# Patient Record
Sex: MALE | Race: OTHER | NOT HISPANIC OR LATINO
[De-identification: names, ages, dates, MRNs, and addresses within clinical notes are randomized per-mention and may not be internally consistent; named-entity substitution may affect disease eponyms.]

---

## 2019-01-29 ENCOUNTER — RX RENEWAL (OUTPATIENT)
Age: 59
End: 2019-01-29

## 2019-02-22 ENCOUNTER — RX RENEWAL (OUTPATIENT)
Age: 59
End: 2019-02-22

## 2019-05-23 ENCOUNTER — RX RENEWAL (OUTPATIENT)
Age: 59
End: 2019-05-23

## 2019-11-29 ENCOUNTER — RX RENEWAL (OUTPATIENT)
Age: 59
End: 2019-11-29

## 2019-12-11 ENCOUNTER — APPOINTMENT (OUTPATIENT)
Dept: INTERNAL MEDICINE | Facility: CLINIC | Age: 59
End: 2019-12-11
Payer: COMMERCIAL

## 2019-12-11 VITALS
HEART RATE: 60 BPM | HEIGHT: 67 IN | OXYGEN SATURATION: 97 % | RESPIRATION RATE: 16 BRPM | BODY MASS INDEX: 29.22 KG/M2 | TEMPERATURE: 97.5 F | WEIGHT: 186.2 LBS

## 2019-12-11 DIAGNOSIS — Z87.19 PERSONAL HISTORY OF OTHER DISEASES OF THE DIGESTIVE SYSTEM: ICD-10-CM

## 2019-12-11 DIAGNOSIS — Z78.9 OTHER SPECIFIED HEALTH STATUS: ICD-10-CM

## 2019-12-11 PROCEDURE — 99214 OFFICE O/P EST MOD 30 MIN: CPT | Mod: 25

## 2019-12-11 PROCEDURE — 90686 IIV4 VACC NO PRSV 0.5 ML IM: CPT

## 2019-12-11 PROCEDURE — G0008: CPT

## 2019-12-11 NOTE — HISTORY OF PRESENT ILLNESS
[FreeTextEntry1] : f/u diabetis, htn [de-identified] : seeing endo and heme for cll--last hba1c 5.2--occ ED--not pervasive

## 2019-12-11 NOTE — ASSESSMENT
[FreeTextEntry1] : flu shot\par tiral viagra\par testosterone level suggested with next endo labdraw\par

## 2019-12-11 NOTE — HEALTH RISK ASSESSMENT
[Patient reported colonoscopy was normal] : Patient reported colonoscopy was normal [ColonoscopyDate] : 1/18 [ColonoscopyComments] :  bessie saucedo

## 2020-04-27 ENCOUNTER — RX RENEWAL (OUTPATIENT)
Age: 60
End: 2020-04-27

## 2020-07-01 ENCOUNTER — APPOINTMENT (OUTPATIENT)
Dept: INTERNAL MEDICINE | Facility: CLINIC | Age: 60
End: 2020-07-01
Payer: COMMERCIAL

## 2020-07-01 VITALS
BODY MASS INDEX: 29.19 KG/M2 | HEIGHT: 67 IN | TEMPERATURE: 98.3 F | RESPIRATION RATE: 16 BRPM | DIASTOLIC BLOOD PRESSURE: 72 MMHG | OXYGEN SATURATION: 97 % | SYSTOLIC BLOOD PRESSURE: 129 MMHG | WEIGHT: 186 LBS | HEART RATE: 72 BPM

## 2020-07-01 PROCEDURE — 99214 OFFICE O/P EST MOD 30 MIN: CPT

## 2020-07-01 PROCEDURE — G0444 DEPRESSION SCREEN ANNUAL: CPT

## 2020-07-01 RX ORDER — ATORVASTATIN CALCIUM 20 MG/1
20 TABLET, FILM COATED ORAL
Qty: 90 | Refills: 1 | Status: ACTIVE | COMMUNITY
Start: 1900-01-01 | End: 1900-01-01

## 2020-07-01 NOTE — HISTORY OF PRESENT ILLNESS
[FreeTextEntry1] : f/u htn [de-identified] : saw heme (vick) 3wks ago--stable cll--stage 0--no tx\par seeing endo (trena)\par am glu <120

## 2020-07-01 NOTE — ASSESSMENT
[FreeTextEntry1] : labs --to lab\par renewed meds\par coronary ct scan for risk strat\par cpx in 6 months\par renew rxn

## 2020-07-06 ENCOUNTER — TRANSCRIPTION ENCOUNTER (OUTPATIENT)
Age: 60
End: 2020-07-06

## 2020-07-23 ENCOUNTER — TRANSCRIPTION ENCOUNTER (OUTPATIENT)
Age: 60
End: 2020-07-23

## 2020-10-13 ENCOUNTER — RX RENEWAL (OUTPATIENT)
Age: 60
End: 2020-10-13

## 2021-01-25 ENCOUNTER — RX RENEWAL (OUTPATIENT)
Age: 61
End: 2021-01-25

## 2021-02-11 ENCOUNTER — APPOINTMENT (OUTPATIENT)
Dept: INTERNAL MEDICINE | Facility: CLINIC | Age: 61
End: 2021-02-11
Payer: COMMERCIAL

## 2021-02-11 VITALS — DIASTOLIC BLOOD PRESSURE: 74 MMHG | SYSTOLIC BLOOD PRESSURE: 122 MMHG

## 2021-02-11 VITALS
OXYGEN SATURATION: 96 % | BODY MASS INDEX: 30.5 KG/M2 | RESPIRATION RATE: 16 BRPM | TEMPERATURE: 98.3 F | WEIGHT: 194.31 LBS | HEART RATE: 69 BPM | HEIGHT: 67 IN

## 2021-02-11 PROCEDURE — 99214 OFFICE O/P EST MOD 30 MIN: CPT

## 2021-02-11 PROCEDURE — 99072 ADDL SUPL MATRL&STAF TM PHE: CPT

## 2021-02-11 NOTE — HISTORY OF PRESENT ILLNESS
[FreeTextEntry1] : f/u htn, diabetes [de-identified] : seeing heme (vick)\par reviewed labs from heme--plt 98k--cll hasn’t been treated\par to get labs done from endo (trena)\par last hba1c mid 5's\par no home bp's

## 2021-02-17 RX ORDER — ZOSTER VACCINE RECOMBINANT, ADJUVANTED 50 MCG/0.5
50 KIT INTRAMUSCULAR
Qty: 1 | Refills: 0 | Status: ACTIVE | COMMUNITY
Start: 2021-02-17 | End: 1900-01-01

## 2021-07-08 ENCOUNTER — RX RENEWAL (OUTPATIENT)
Age: 61
End: 2021-07-08

## 2021-10-07 ENCOUNTER — APPOINTMENT (OUTPATIENT)
Dept: INTERNAL MEDICINE | Facility: CLINIC | Age: 61
End: 2021-10-07
Payer: COMMERCIAL

## 2021-10-07 ENCOUNTER — NON-APPOINTMENT (OUTPATIENT)
Age: 61
End: 2021-10-07

## 2021-10-07 VITALS
SYSTOLIC BLOOD PRESSURE: 128 MMHG | BODY MASS INDEX: 30.92 KG/M2 | HEART RATE: 78 BPM | WEIGHT: 197 LBS | DIASTOLIC BLOOD PRESSURE: 74 MMHG | HEIGHT: 67 IN | RESPIRATION RATE: 16 BRPM

## 2021-10-07 DIAGNOSIS — Z23 ENCOUNTER FOR IMMUNIZATION: ICD-10-CM

## 2021-10-07 DIAGNOSIS — Z83.3 FAMILY HISTORY OF DIABETES MELLITUS: ICD-10-CM

## 2021-10-07 PROCEDURE — 99396 PREV VISIT EST AGE 40-64: CPT | Mod: 25

## 2021-10-07 PROCEDURE — 93000 ELECTROCARDIOGRAM COMPLETE: CPT

## 2021-10-07 RX ORDER — METFORMIN ER 500 MG 500 MG/1
500 TABLET ORAL
Qty: 60 | Refills: 0 | Status: DISCONTINUED | COMMUNITY
End: 2021-10-07

## 2021-10-07 NOTE — HEALTH RISK ASSESSMENT
[No falls in past year] : Patient reported no falls in the past year [0] : 2) Feeling down, depressed, or hopeless: Not at all (0) [With Significant Other] : lives with significant other [] :  [Fully functional (bathing, dressing, toileting, transferring, walking, feeding)] : Fully functional (bathing, dressing, toileting, transferring, walking, feeding) [Fully functional (using the telephone, shopping, preparing meals, housekeeping, doing laundry, using] : Fully functional and needs no help or supervision to perform IADLs (using the telephone, shopping, preparing meals, housekeeping, doing laundry, using transportation, managing medications and managing finances) [de-identified] : derm [[id number]] : 0

## 2021-10-07 NOTE — HISTORY OF PRESENT ILLNESS
[FreeTextEntry1] : cpx [de-identified] : seeing onc (Cate)-no tx for CLL\par seeing endo (Florian)--brings labs--hba1c 5.5\par exercises daily\par covid vaccinated with JCHERRY

## 2021-10-08 LAB
APPEARANCE: CLEAR
BACTERIA: NEGATIVE
BILIRUBIN URINE: NEGATIVE
BLOOD URINE: NEGATIVE
COLOR: YELLOW
GLUCOSE QUALITATIVE U: NEGATIVE
HYALINE CASTS: 0 /LPF
KETONES URINE: NEGATIVE
LEUKOCYTE ESTERASE URINE: NEGATIVE
MICROSCOPIC-UA: NORMAL
NITRITE URINE: NEGATIVE
PH URINE: 6
PROTEIN URINE: ABNORMAL
RED BLOOD CELLS URINE: 1 /HPF
SPECIFIC GRAVITY URINE: 1.02
SQUAMOUS EPITHELIAL CELLS: 0 /HPF
UROBILINOGEN URINE: NORMAL
WHITE BLOOD CELLS URINE: 1 /HPF

## 2022-06-03 ENCOUNTER — RX RENEWAL (OUTPATIENT)
Age: 62
End: 2022-06-03

## 2022-06-13 ENCOUNTER — APPOINTMENT (OUTPATIENT)
Dept: INTERNAL MEDICINE | Facility: CLINIC | Age: 62
End: 2022-06-13
Payer: COMMERCIAL

## 2022-06-13 VITALS
DIASTOLIC BLOOD PRESSURE: 81 MMHG | TEMPERATURE: 97.5 F | OXYGEN SATURATION: 98 % | WEIGHT: 197 LBS | HEART RATE: 82 BPM | SYSTOLIC BLOOD PRESSURE: 144 MMHG | BODY MASS INDEX: 30.92 KG/M2 | HEIGHT: 67 IN

## 2022-06-13 PROCEDURE — 36415 COLL VENOUS BLD VENIPUNCTURE: CPT

## 2022-06-13 PROCEDURE — 99213 OFFICE O/P EST LOW 20 MIN: CPT | Mod: 25

## 2022-06-13 NOTE — HISTORY OF PRESENT ILLNESS
[FreeTextEntry1] : f/u htn [de-identified] : seeing onc and endo--had labs done recently\par no change in meds

## 2022-06-14 LAB — PSA SERPL-MCNC: 0.78 NG/ML

## 2022-07-07 ENCOUNTER — RX RENEWAL (OUTPATIENT)
Age: 62
End: 2022-07-07

## 2022-12-19 ENCOUNTER — TRANSCRIPTION ENCOUNTER (OUTPATIENT)
Age: 62
End: 2022-12-19

## 2023-06-12 ENCOUNTER — RX RENEWAL (OUTPATIENT)
Age: 63
End: 2023-06-12

## 2023-06-26 ENCOUNTER — RX RENEWAL (OUTPATIENT)
Age: 63
End: 2023-06-26

## 2023-07-13 ENCOUNTER — APPOINTMENT (OUTPATIENT)
Dept: INTERNAL MEDICINE | Facility: CLINIC | Age: 63
End: 2023-07-13
Payer: COMMERCIAL

## 2023-07-13 ENCOUNTER — NON-APPOINTMENT (OUTPATIENT)
Age: 63
End: 2023-07-13

## 2023-07-13 VITALS
WEIGHT: 194 LBS | TEMPERATURE: 98 F | HEIGHT: 67 IN | HEART RATE: 88 BPM | BODY MASS INDEX: 30.45 KG/M2 | OXYGEN SATURATION: 96 %

## 2023-07-13 VITALS — SYSTOLIC BLOOD PRESSURE: 118 MMHG | DIASTOLIC BLOOD PRESSURE: 70 MMHG

## 2023-07-13 DIAGNOSIS — Z00.00 ENCOUNTER FOR GENERAL ADULT MEDICAL EXAMINATION W/OUT ABNORMAL FINDINGS: ICD-10-CM

## 2023-07-13 PROCEDURE — 93000 ELECTROCARDIOGRAM COMPLETE: CPT

## 2023-07-13 PROCEDURE — 99396 PREV VISIT EST AGE 40-64: CPT | Mod: 25

## 2023-07-13 NOTE — HISTORY OF PRESENT ILLNESS
[FreeTextEntry1] : cpx [de-identified] : on metformin 500/1000mgpm, losartan, lipitor. norvasc 5mg\par seeing endo (Du)\par seeing heme for cll--no tx\par labs reviewed from 3/23--hba1c 6.1\par UTD with optho

## 2023-07-14 LAB — PSA SERPL-MCNC: 0.79 NG/ML

## 2023-07-17 ENCOUNTER — RX RENEWAL (OUTPATIENT)
Age: 63
End: 2023-07-17

## 2023-09-15 ENCOUNTER — RX RENEWAL (OUTPATIENT)
Age: 63
End: 2023-09-15

## 2024-01-08 ENCOUNTER — RX RENEWAL (OUTPATIENT)
Age: 64
End: 2024-01-08

## 2024-01-29 ENCOUNTER — RX RENEWAL (OUTPATIENT)
Age: 64
End: 2024-01-29

## 2024-03-01 ENCOUNTER — APPOINTMENT (OUTPATIENT)
Dept: INTERNAL MEDICINE | Facility: CLINIC | Age: 64
End: 2024-03-01
Payer: COMMERCIAL

## 2024-03-01 VITALS
RESPIRATION RATE: 15 BRPM | WEIGHT: 191 LBS | HEIGHT: 67 IN | DIASTOLIC BLOOD PRESSURE: 75 MMHG | HEART RATE: 73 BPM | OXYGEN SATURATION: 97 % | SYSTOLIC BLOOD PRESSURE: 139 MMHG | TEMPERATURE: 97.7 F | BODY MASS INDEX: 29.98 KG/M2

## 2024-03-01 VITALS — SYSTOLIC BLOOD PRESSURE: 120 MMHG | DIASTOLIC BLOOD PRESSURE: 72 MMHG

## 2024-03-01 DIAGNOSIS — N52.9 MALE ERECTILE DYSFUNCTION, UNSPECIFIED: ICD-10-CM

## 2024-03-01 DIAGNOSIS — C91.10 CHRONIC LYMPHOCYTIC LEUKEMIA OF B-CELL TYPE NOT HAVING ACHIEVED REMISSION: ICD-10-CM

## 2024-03-01 PROCEDURE — 99214 OFFICE O/P EST MOD 30 MIN: CPT

## 2024-03-01 RX ORDER — AMLODIPINE BESYLATE 5 MG/1
5 TABLET ORAL
Refills: 0 | Status: ACTIVE | COMMUNITY

## 2024-03-01 RX ORDER — SILDENAFIL 100 MG/1
100 TABLET, FILM COATED ORAL
Qty: 15 | Refills: 3 | Status: ACTIVE | COMMUNITY
Start: 2019-12-11 | End: 1900-01-01

## 2024-03-01 RX ORDER — METFORMIN HYDROCHLORIDE 500 MG/1
500 TABLET, COATED ORAL
Refills: 0 | Status: ACTIVE | COMMUNITY

## 2024-03-01 NOTE — ASSESSMENT
[FreeTextEntry1] : remote monitoring consult lab to fax results of labs to be done renew viagra per pt request

## 2024-03-01 NOTE — HISTORY OF PRESENT ILLNESS
[FreeTextEntry1] : f/u htn [de-identified] : to go for labs per endo metformin increased and amlodipine 5mg added--other meds same stable cll --sees heme home bps still labile walks daily

## 2024-03-18 ENCOUNTER — APPOINTMENT (OUTPATIENT)
Dept: CARE COORDINATION | Facility: HOME HEALTH | Age: 64
End: 2024-03-18
Payer: COMMERCIAL

## 2024-03-18 PROCEDURE — 99214 OFFICE O/P EST MOD 30 MIN: CPT | Mod: 95

## 2024-03-18 PROCEDURE — ZZZZZ: CPT

## 2024-03-19 NOTE — HEALTH RISK ASSESSMENT
[Yes] : Yes [2 - 3 times a week (3 pts)] : 2 - 3  times a week (3 points) [1 or 2 (0 pts)] : 1 or 2 (0 points) [Never (0 pts)] : Never (0 points) [No] : In the past 12 months have you used drugs other than those required for medical reasons? No [No falls in past year] : Patient reported no falls in the past year [0] : 2) Feeling down, depressed, or hopeless: Not at all (0) [de-identified] : walks 2 1/2 miles a day

## 2024-03-19 NOTE — HISTORY OF PRESENT ILLNESS
[Check glucose ___ x/day] : Patient checks  blood glucose [unfilled]  times a day [FreeTextEntry6] : KATERINA MEZA is a 63 year old male with history of CLL, HTN, HLD, DM, & erectile dysfunction who has had high blood pressure readings above 130/90 and was referred by his PCP.  Current blood pressure medication(s) include(s) Amlodipine 5mg PO daily & Losartan 100mg PO daily.  Discussed blood pressure ranges and goal (less than 130/80).  He is motivated to be healthy and understand/improve his blood pressure status.  He is retired, resides with his wife & has 2 children.  The patient is a candidate for remote physiologic monitoring (RPM) and will be enrolled into the RPM program for blood pressure monitoring and medication management. Education on the RPM program & device was provided to the patient. Answered all questions and concerns.    Nutrition - wife prepares & cooks meals which consists of rotisserie chicken, pork, fish   Restorative sleep - sleeps 8 hours a day   Physical Activity - walks 2 1/2 miles daily, has a gym at home, does [email or web address]   Stress Management - adequate   SDOH - does not report any difficulties or contributing factors to obtaining health care.   Social Connection - adequate   Avoidance of Risky substances - has 1 beer 2-3 times a week                       [Regular podiatrist visits] : Patient did not have regular podiatrist visit [Moderate Intensity] : Patient is currently on moderate intensity statin  therapy [Understanding of foot care] : Patient expressed understanding of foot care [Checks BP Sporadically] : The patient checks ~his/her~ blood pressure sporadically [<140/90] : Target blood pressure is <140/90 [Managed with medications] : managed with  medication

## 2024-03-19 NOTE — ASSESSMENT
[FreeTextEntry1] : KATERINA MEZA is a 63 year old male with history as described who has had in office high blood pressure readings above a target of BP<130/90. He is motivated to be healthy and understand/improve his blood pressure status.   The patient is a candidate for remote physiologic monitoring (RPM) and will be enrolled into the RPM program for blood pressure monitoring and medication management.  Education on the RPM program and device was provided to the patient. Answered all questions and concerns.   Nutrition - adequate Restorative sleep - adequate Physical Activity - adequate Stress Management - adequate SDOH - does not report any difficulties or contributing factors to obtaining health care. Social Connection - adequate Avoidance of Risky substances - adequate

## 2024-03-19 NOTE — PLAN
[FreeTextEntry1] : Patient with high blood pressure readings not at goal and would benefit from remote monitoring and management to improve high blood pressure and overall lifestyle.  Discussed blood pressure goal and ranges with patient and expectations.  Monitor blood pressure twice a day in the AM and PM.  Contingency plan discussed with patient for out of range values,   1. Start RPM. Assess for need for further lifestyle changes vs medication   2. Patient oriented to RPM program including twice daily measurements, escalations, regular monitoring and touch bases as well as possible need to start and/or titrate medications   3. Patient has had appropriate screenings.   4. Medication Plan: Continue current medication, will reevaluate with data collection.   5. Nutrition - continue current dietary patterns   6. Restorative sleep - continue current sleep hygiene   7. Physical Activity - continue current level of activity   8. Stress Management - continue current stress management approaches   9. SDOH - does not report any difficulties or contributing factors to obtaining health care.   10. Social Connection - continue associations with family and friends   11. Avoidance of Risky substances - continue to avoid

## 2024-04-01 ENCOUNTER — APPOINTMENT (OUTPATIENT)
Dept: CARE COORDINATION | Facility: CLINIC | Age: 64
End: 2024-04-01
Payer: COMMERCIAL

## 2024-04-01 PROCEDURE — 99457 RPM TX MGMT 1ST 20 MIN: CPT

## 2024-04-03 VITALS — DIASTOLIC BLOOD PRESSURE: 69 MMHG | HEART RATE: 80 BPM | SYSTOLIC BLOOD PRESSURE: 115 MMHG

## 2024-04-04 NOTE — ASSESSMENT
[FreeTextEntry1] : See summary report dated 4/3/2024 for synchronous communication and billable hours report dated 4/3/2024 for total minutes.     Biofourmis data reviewed; patient at goal with interactive encounters and monitoring. Correct blood pressure cuff size is confirmed. Will continue to review medications and times, nutrition, sleep hygiene, physical activity, stress management, social connections, and avoidance of risky substances. Plan is to continue to monitor blood pressure and consider graduation from program if adequate BP control maintained.

## 2024-04-26 ENCOUNTER — NON-APPOINTMENT (OUTPATIENT)
Age: 64
End: 2024-04-26

## 2024-04-29 VITALS — DIASTOLIC BLOOD PRESSURE: 74 MMHG | HEART RATE: 90 BPM | SYSTOLIC BLOOD PRESSURE: 127 MMHG

## 2024-05-01 ENCOUNTER — APPOINTMENT (OUTPATIENT)
Dept: CARE COORDINATION | Facility: HOME HEALTH | Age: 64
End: 2024-05-01
Payer: COMMERCIAL

## 2024-05-01 PROCEDURE — 99457 RPM TX MGMT 1ST 20 MIN: CPT

## 2024-05-05 ENCOUNTER — TRANSCRIPTION ENCOUNTER (OUTPATIENT)
Age: 64
End: 2024-05-05

## 2024-05-05 ENCOUNTER — RESULT REVIEW (OUTPATIENT)
Age: 64
End: 2024-05-05

## 2024-05-05 ENCOUNTER — INPATIENT (INPATIENT)
Facility: HOSPITAL | Age: 64
LOS: 0 days | Discharge: HOME | DRG: 395 | End: 2024-05-05
Attending: HOSPITALIST | Admitting: HOSPITALIST
Payer: COMMERCIAL

## 2024-05-05 VITALS
WEIGHT: 188.72 LBS | RESPIRATION RATE: 20 BRPM | DIASTOLIC BLOOD PRESSURE: 85 MMHG | TEMPERATURE: 98 F | HEIGHT: 67 IN | SYSTOLIC BLOOD PRESSURE: 160 MMHG | HEART RATE: 72 BPM | OXYGEN SATURATION: 98 %

## 2024-05-05 VITALS
RESPIRATION RATE: 18 BRPM | TEMPERATURE: 97 F | SYSTOLIC BLOOD PRESSURE: 140 MMHG | HEART RATE: 75 BPM | DIASTOLIC BLOOD PRESSURE: 80 MMHG | OXYGEN SATURATION: 97 %

## 2024-05-05 DIAGNOSIS — Z98.890 OTHER SPECIFIED POSTPROCEDURAL STATES: Chronic | ICD-10-CM

## 2024-05-05 DIAGNOSIS — T17.208A UNSPECIFIED FOREIGN BODY IN PHARYNX CAUSING OTHER INJURY, INITIAL ENCOUNTER: ICD-10-CM

## 2024-05-05 DIAGNOSIS — T18.108A UNSPECIFIED FOREIGN BODY IN ESOPHAGUS CAUSING OTHER INJURY, INITIAL ENCOUNTER: ICD-10-CM

## 2024-05-05 LAB
ALBUMIN SERPL ELPH-MCNC: 4.9 G/DL — SIGNIFICANT CHANGE UP (ref 3.3–5.2)
ALP SERPL-CCNC: 72 U/L — SIGNIFICANT CHANGE UP (ref 40–120)
ALT FLD-CCNC: 54 U/L — HIGH
ANION GAP SERPL CALC-SCNC: 13 MMOL/L — SIGNIFICANT CHANGE UP (ref 5–17)
APTT BLD: 31.7 SEC — SIGNIFICANT CHANGE UP (ref 24.5–35.6)
AST SERPL-CCNC: 41 U/L — HIGH
BASOPHILS # BLD AUTO: 0 K/UL — SIGNIFICANT CHANGE UP (ref 0–0.2)
BASOPHILS NFR BLD AUTO: 0 % — SIGNIFICANT CHANGE UP (ref 0–2)
BILIRUB SERPL-MCNC: 0.5 MG/DL — SIGNIFICANT CHANGE UP (ref 0.4–2)
BLD GP AB SCN SERPL QL: SIGNIFICANT CHANGE UP
BUN SERPL-MCNC: 17.9 MG/DL — SIGNIFICANT CHANGE UP (ref 8–20)
CALCIUM SERPL-MCNC: 9.3 MG/DL — SIGNIFICANT CHANGE UP (ref 8.4–10.5)
CHLORIDE SERPL-SCNC: 107 MMOL/L — SIGNIFICANT CHANGE UP (ref 96–108)
CO2 SERPL-SCNC: 23 MMOL/L — SIGNIFICANT CHANGE UP (ref 22–29)
CREAT SERPL-MCNC: 0.89 MG/DL — SIGNIFICANT CHANGE UP (ref 0.5–1.3)
EGFR: 98 ML/MIN/1.73M2 — SIGNIFICANT CHANGE UP
EOSINOPHIL # BLD AUTO: 0.29 K/UL — SIGNIFICANT CHANGE UP (ref 0–0.5)
EOSINOPHIL NFR BLD AUTO: 0.9 % — SIGNIFICANT CHANGE UP (ref 0–6)
GLUCOSE BLDC GLUCOMTR-MCNC: 105 MG/DL — HIGH (ref 70–99)
GLUCOSE BLDC GLUCOMTR-MCNC: 110 MG/DL — HIGH (ref 70–99)
GLUCOSE BLDC GLUCOMTR-MCNC: 118 MG/DL — HIGH (ref 70–99)
GLUCOSE SERPL-MCNC: 126 MG/DL — HIGH (ref 70–99)
HCT VFR BLD CALC: 39.8 % — SIGNIFICANT CHANGE UP (ref 39–50)
HGB BLD-MCNC: 13.5 G/DL — SIGNIFICANT CHANGE UP (ref 13–17)
INR BLD: 0.99 RATIO — SIGNIFICANT CHANGE UP (ref 0.85–1.18)
LYMPHOCYTES # BLD AUTO: 18.88 K/UL — HIGH (ref 1–3.3)
LYMPHOCYTES # BLD AUTO: 58.3 % — HIGH (ref 13–44)
MANUAL SMEAR VERIFICATION: SIGNIFICANT CHANGE UP
MCHC RBC-ENTMCNC: 31.6 PG — SIGNIFICANT CHANGE UP (ref 27–34)
MCHC RBC-ENTMCNC: 33.9 GM/DL — SIGNIFICANT CHANGE UP (ref 32–36)
MCV RBC AUTO: 93.2 FL — SIGNIFICANT CHANGE UP (ref 80–100)
METAMYELOCYTES # FLD: 0.4 % — HIGH (ref 0–0)
MONOCYTES # BLD AUTO: 3.21 K/UL — HIGH (ref 0–0.9)
MONOCYTES NFR BLD AUTO: 9.9 % — SIGNIFICANT CHANGE UP (ref 2–14)
NEUTROPHILS # BLD AUTO: 2.17 K/UL — SIGNIFICANT CHANGE UP (ref 1.8–7.4)
NEUTROPHILS NFR BLD AUTO: 6.7 % — LOW (ref 43–77)
OVALOCYTES BLD QL SMEAR: SLIGHT — SIGNIFICANT CHANGE UP
PLAT MORPH BLD: NORMAL — SIGNIFICANT CHANGE UP
PLATELET # BLD AUTO: 118 K/UL — LOW (ref 150–400)
POIKILOCYTOSIS BLD QL AUTO: SLIGHT — SIGNIFICANT CHANGE UP
POLYCHROMASIA BLD QL SMEAR: SLIGHT — SIGNIFICANT CHANGE UP
POTASSIUM SERPL-MCNC: 4.9 MMOL/L — SIGNIFICANT CHANGE UP (ref 3.5–5.3)
POTASSIUM SERPL-SCNC: 4.9 MMOL/L — SIGNIFICANT CHANGE UP (ref 3.5–5.3)
PROT SERPL-MCNC: 7.1 G/DL — SIGNIFICANT CHANGE UP (ref 6.6–8.7)
PROTHROM AB SERPL-ACNC: 11 SEC — SIGNIFICANT CHANGE UP (ref 9.5–13)
RBC # BLD: 4.27 M/UL — SIGNIFICANT CHANGE UP (ref 4.2–5.8)
RBC # FLD: 13 % — SIGNIFICANT CHANGE UP (ref 10.3–14.5)
RBC BLD AUTO: ABNORMAL
SMUDGE CELLS # BLD: PRESENT — SIGNIFICANT CHANGE UP
SODIUM SERPL-SCNC: 143 MMOL/L — SIGNIFICANT CHANGE UP (ref 135–145)
STOMATOCYTES BLD QL SMEAR: SLIGHT — SIGNIFICANT CHANGE UP
VARIANT LYMPHS # BLD: 23.8 % — HIGH (ref 0–6)
WBC # BLD: 32.39 K/UL — HIGH (ref 3.8–10.5)
WBC # FLD AUTO: 32.39 K/UL — HIGH (ref 3.8–10.5)

## 2024-05-05 PROCEDURE — 88342 IMHCHEM/IMCYTCHM 1ST ANTB: CPT

## 2024-05-05 PROCEDURE — 80053 COMPREHEN METABOLIC PANEL: CPT

## 2024-05-05 PROCEDURE — 36415 COLL VENOUS BLD VENIPUNCTURE: CPT

## 2024-05-05 PROCEDURE — 99222 1ST HOSP IP/OBS MODERATE 55: CPT

## 2024-05-05 PROCEDURE — 88342 IMHCHEM/IMCYTCHM 1ST ANTB: CPT | Mod: 26

## 2024-05-05 PROCEDURE — 85025 COMPLETE CBC W/AUTO DIFF WBC: CPT

## 2024-05-05 PROCEDURE — 82962 GLUCOSE BLOOD TEST: CPT

## 2024-05-05 PROCEDURE — 86901 BLOOD TYPING SEROLOGIC RH(D): CPT

## 2024-05-05 PROCEDURE — 86850 RBC ANTIBODY SCREEN: CPT

## 2024-05-05 PROCEDURE — 99285 EMERGENCY DEPT VISIT HI MDM: CPT

## 2024-05-05 PROCEDURE — 43239 EGD BIOPSY SINGLE/MULTIPLE: CPT

## 2024-05-05 PROCEDURE — 85610 PROTHROMBIN TIME: CPT

## 2024-05-05 PROCEDURE — 85730 THROMBOPLASTIN TIME PARTIAL: CPT

## 2024-05-05 PROCEDURE — 88305 TISSUE EXAM BY PATHOLOGIST: CPT | Mod: 26

## 2024-05-05 PROCEDURE — 99283 EMERGENCY DEPT VISIT LOW MDM: CPT | Mod: 25

## 2024-05-05 PROCEDURE — 88305 TISSUE EXAM BY PATHOLOGIST: CPT

## 2024-05-05 PROCEDURE — 86900 BLOOD TYPING SEROLOGIC ABO: CPT

## 2024-05-05 RX ORDER — DEXTROSE 50 % IN WATER 50 %
12.5 SYRINGE (ML) INTRAVENOUS ONCE
Refills: 0 | Status: DISCONTINUED | OUTPATIENT
Start: 2024-05-05 | End: 2024-05-05

## 2024-05-05 RX ORDER — DEXTROSE 10 % IN WATER 10 %
125 INTRAVENOUS SOLUTION INTRAVENOUS ONCE
Refills: 0 | Status: DISCONTINUED | OUTPATIENT
Start: 2024-05-05 | End: 2024-05-05

## 2024-05-05 RX ORDER — DEXTROSE 50 % IN WATER 50 %
15 SYRINGE (ML) INTRAVENOUS ONCE
Refills: 0 | Status: DISCONTINUED | OUTPATIENT
Start: 2024-05-05 | End: 2024-05-05

## 2024-05-05 RX ORDER — SODIUM CHLORIDE 9 MG/ML
1000 INJECTION, SOLUTION INTRAVENOUS
Refills: 0 | Status: DISCONTINUED | OUTPATIENT
Start: 2024-05-05 | End: 2024-05-05

## 2024-05-05 RX ORDER — ONDANSETRON 8 MG/1
4 TABLET, FILM COATED ORAL ONCE
Refills: 0 | Status: DISCONTINUED | OUTPATIENT
Start: 2024-05-05 | End: 2024-05-05

## 2024-05-05 RX ORDER — ATORVASTATIN CALCIUM 80 MG/1
1 TABLET, FILM COATED ORAL
Refills: 0 | DISCHARGE

## 2024-05-05 RX ORDER — SODIUM CHLORIDE 9 MG/ML
1000 INJECTION INTRAMUSCULAR; INTRAVENOUS; SUBCUTANEOUS
Refills: 0 | Status: DISCONTINUED | OUTPATIENT
Start: 2024-05-05 | End: 2024-05-05

## 2024-05-05 RX ORDER — ONDANSETRON 8 MG/1
4 TABLET, FILM COATED ORAL EVERY 8 HOURS
Refills: 0 | Status: DISCONTINUED | OUTPATIENT
Start: 2024-05-05 | End: 2024-05-05

## 2024-05-05 RX ORDER — GLUCAGON INJECTION, SOLUTION 0.5 MG/.1ML
1 INJECTION, SOLUTION SUBCUTANEOUS ONCE
Refills: 0 | Status: DISCONTINUED | OUTPATIENT
Start: 2024-05-05 | End: 2024-05-05

## 2024-05-05 RX ORDER — INSULIN LISPRO 100/ML
VIAL (ML) SUBCUTANEOUS EVERY 6 HOURS
Refills: 0 | Status: DISCONTINUED | OUTPATIENT
Start: 2024-05-05 | End: 2024-05-05

## 2024-05-05 RX ORDER — DEXTROSE 50 % IN WATER 50 %
25 SYRINGE (ML) INTRAVENOUS ONCE
Refills: 0 | Status: DISCONTINUED | OUTPATIENT
Start: 2024-05-05 | End: 2024-05-05

## 2024-05-05 RX ORDER — LOSARTAN POTASSIUM 100 MG/1
1 TABLET, FILM COATED ORAL
Refills: 0 | DISCHARGE

## 2024-05-05 RX ORDER — OMEPRAZOLE 10 MG/1
1 CAPSULE, DELAYED RELEASE ORAL
Qty: 60 | Refills: 0
Start: 2024-05-05 | End: 2024-06-03

## 2024-05-05 RX ORDER — AMLODIPINE BESYLATE 2.5 MG/1
1 TABLET ORAL
Refills: 0 | DISCHARGE

## 2024-05-05 RX ORDER — METFORMIN HYDROCHLORIDE 850 MG/1
2 TABLET ORAL
Refills: 0 | DISCHARGE

## 2024-05-05 NOTE — H&P ADULT - ASSESSMENT
Patient is a 62 y/o male w/ PMHx of HTN, HLD, and DM who presents with a chief complaint of dysphagia and feeling of foreign body stuck in throat since last night.  Patient reports he attended a wedding last night and around 10pm he ate some short ribs. since then he has a feeling of something stuck in lower part of throat and has not been able to eat or drink anything since then. Patient reports that food does frequently get stuck in his esophagus, but he is able to clear it with liquids. He states he tried to dislodge bolus with water, however when he drinks water it immediately comes back up. He had an endoscopy 6 months ago for his dysphagia, and reports "a balloon was used to inflate" his esophagus. He also reports intermittent acid reflux for which he takes OTC Pepcid . Has heartburn few times a week.He has been seen by GI and is scheduled to go to OR for foreign body removal via EGD.    #. Dysphagia due to foreign body in upper esophagus. likely a piece of meat stuck there. GI to take him to OR for EGD and removal of foreign body. NPO and start IV fluids    #. Type 2 DM. start SSI Q6H    #. HTN. hold BP meds for now    #. DVT prophylaxis: SCD

## 2024-05-05 NOTE — CONSULT NOTE ADULT - NS ATTEST RISK PROBLEM GEN_ALL_CORE FT
Esophageal foreign body -     Patient with hx of dysphagia- required esophageal dilation 6 months ago. Has  never required EGD for food impaction in the past. Now unable to tolerate secretions ,          Patient to have urgent EGD for foreign body removal . Not tolerating secretions  I explained the risks and benefits of egd including , but not limited to bleeding, infection , perforation .     Discussed plan for EGD with ER attending and PA.

## 2024-05-05 NOTE — ED PROVIDER NOTE - OBJECTIVE STATEMENT
62 yo male presenting to the ED with FB sensation to lower throat after eating short ribs last evening at sons wedding no bone. reports feeling the rib get stuck, proceeded to drink a beer then vomited all liquid. has been unable to tolerate liquid since with multiple episodes of vomiting. reports prior episodes of food getting stuck in the throat, has never required surgical intervention. previous endoscopy 6 months ago through [facility]

## 2024-05-05 NOTE — CONSULT NOTE ADULT - SUBJECTIVE AND OBJECTIVE BOX
Chief Complaint:  Patient is a 63y old  Male who presents with a chief complaint of foreign body sensation    HPI:   Patient is a 64 y/o male w/ PMHx of HTN, HLD, and DM who presents with a chief complaint of foreign body sensation Patient reports he attended a wedding last night and around 10pm he ate some short ribs which has remain lodged in the lower part of his esophagus. Patient reports that food does frequently get stuck in his esophagus, but he is able to clear it with liquids. He states he tried to dislodge bolus with water, however when he drinks water it immediately comes back up. He had an endoscopy 6 months ago for his dysphagia, and reports "a balloon was used to inflate" his esophagus. He also reports intermittent acid reflux for which he takes OTC Pepcid Not on any anticoagulation or NSAIDs. Denies use of tobacco, alcohol, and illicit drugs. He had a colonoscopy 6 months ago, normal per patient. Denies chest pain, shortness of breath, palpitations, fever, chills, abdominal pain, hematemesis, hematochezia, and melena.     PAST MEDICAL & SURGICAL HISTORY:  HTN  HLD  DM    REVIEW OF SYSTEMS:   General: Negative  HEENT: Negative  CV: Negative  Respiratory: Negative  GI: See HPI  : Negative  MSK: Negative  Hematologic: Negative  Skin: Negative    MEDICATIONS: Losartan  Amlodipine  Metformin   Lipitor     MEDICATIONS  (STANDING):  glucagon  Injectable 1 milliGRAM(s) IV Push Once  ondansetron Injectable 4 milliGRAM(s) IV Push once    MEDICATIONS  (PRN):          DIET:          ALLERGIES:   Allergies    No Known Allergies    Intolerances        Substance Use:   (  ) never used  (  ) other:  Tobacco Usage:  (   ) never smoked   (   ) former smoker   (   ) current smoker  (     ) pack year  (        ) last cigarette date  Alcohol Usage:    Family History   IBD (  ) Yes   (  ) No  GI Malignancy (  )  Yes    (  ) No    Health Management  Last Colonoscopy:  Last Endoscopy:     VITAL SIGNS:   Vital Signs Last 24 Hrs  T(C): 36.5 (05 May 2024 10:51), Max: 36.5 (05 May 2024 10:51)  T(F): 97.7 (05 May 2024 10:51), Max: 97.7 (05 May 2024 10:51)  HR: 72 (05 May 2024 10:51) (72 - 72)  BP: 160/85 (05 May 2024 10:51) (160/85 - 160/85)  BP(mean): --  RR: 20 (05 May 2024 10:51) (20 - 20)  SpO2: 98% (05 May 2024 10:51) (98% - 98%)    Parameters below as of 05 May 2024 10:51  Patient On (Oxygen Delivery Method): room air      I&O's Summary      PHYSICAL EXAM:   GENERAL:  No acute distress  HEENT:  NC/AT, conjunctiva clear, sclera anicteric  CHEST:  No increased effort  HEART:  Regular rate  ABDOMEN:  Soft, non-tender, non-distended, normoactive bowel sounds, no rebound or guarding  EXTREMITIES: No edema  SKIN:  Warm, dry  NEURO:  Calm, cooperative    LABS:                        13.5   x     )-----------( x        ( 05 May 2024 12:00 )             39.8     Hemoglobin: 13.5 g/dL (05-05-24 @ 12:00)          PT/INR - ( 05 May 2024 12:00 )   PT: 11.0 sec;   INR: 0.99 ratio         PTT - ( 05 May 2024 12:00 )  PTT:31.7 sec                                      RADIOLOGY & ADDITIONAL STUDIES:         Chief Complaint:  Patient is a 63y old  Male who presents with a chief complaint of foreign body sensation    HPI:   Patient is a 64 y/o male w/ PMHx of HTN, HLD, and DM who presents with a chief complaint of foreign body sensation in esophagus.  Patient reports he attended a wedding last night and around 10pm he ate some short ribs which has remain lodged in the mid part of his esophagus. Patient reports that food does frequently get stuck in his esophagus, but he is able to clear it with liquids. He states he tried to dislodge bolus with water, however when he drinks water it immediately comes back up. He had an endoscopy 6 months ago for his dysphagia, and reports "a balloon was used to inflate" his esophagus. He also reports intermittent acid reflux for which he takes OTC Pepcid . Has heartburn few times a week. Not on any anticoagulation or NSAIDs. Denies use of tobacco, alcohol, and illicit drugs. He had a colonoscopy 6 months ago, normal per patient. Denies chest pain, shortness of breath, palpitations, fever, chills, abdominal pain, hematemesis, hematochezia, and melena.     PAST MEDICAL & SURGICAL HISTORY:  HTN  HLD  DM    REVIEW OF SYSTEMS:   General: Negative  HEENT: Negative  CV: Negative  Respiratory: Negative  GI: See HPI  : Negative  MSK: Negative  Hematologic: Negative  Skin: Negative    MEDICATIONS: Losartan  Amlodipine  Metformin   Lipitor     MEDICATIONS  (STANDING):  glucagon  Injectable 1 milliGRAM(s) IV Push Once  ondansetron Injectable 4 milliGRAM(s) IV Push once    MEDICATIONS  (PRN):          DIET:          ALLERGIES:   Allergies    No Known Allergies    Intolerances        Substance Use:   (  ) never used  (  ) other:  Tobacco Usage:  (   ) never smoked   (   ) former smoker   (   ) current smoker  (     ) pack year  (        ) last cigarette date  Alcohol Usage:    Family History   IBD (  ) Yes   (  ) No  GI Malignancy (  )  Yes    (  ) No    Health Management  Last Colonoscopy:  Last Endoscopy:     VITAL SIGNS:   Vital Signs Last 24 Hrs  T(C): 36.5 (05 May 2024 10:51), Max: 36.5 (05 May 2024 10:51)  T(F): 97.7 (05 May 2024 10:51), Max: 97.7 (05 May 2024 10:51)  HR: 72 (05 May 2024 10:51) (72 - 72)  BP: 160/85 (05 May 2024 10:51) (160/85 - 160/85)  BP(mean): --  RR: 20 (05 May 2024 10:51) (20 - 20)  SpO2: 98% (05 May 2024 10:51) (98% - 98%)    Parameters below as of 05 May 2024 10:51  Patient On (Oxygen Delivery Method): room air      I&O's Summary      PHYSICAL EXAM:   GENERAL:  No acute distress  HEENT:  NC/AT, conjunctiva clear, sclera anicteric  CHEST:  No increased effort  HEART:  Regular rate  ABDOMEN:  Soft, non-tender, non-distended, normoactive bowel sounds, no rebound or guarding  EXTREMITIES: No edema  SKIN:  Warm, dry  NEURO:  Calm, cooperative    LABS:                        13.5   x     )-----------( x        ( 05 May 2024 12:00 )             39.8     Hemoglobin: 13.5 g/dL (05-05-24 @ 12:00)          PT/INR - ( 05 May 2024 12:00 )   PT: 11.0 sec;   INR: 0.99 ratio         PTT - ( 05 May 2024 12:00 )  PTT:31.7 sec                                      RADIOLOGY & ADDITIONAL STUDIES:

## 2024-05-05 NOTE — DISCHARGE NOTE PROVIDER - CARE PROVIDER_API CALL
Usha Cordova  Gastroenterology  [address], [address]  [place] [address]  Phone: [phone]  Fax: [phone]  Follow Up Time:

## 2024-05-05 NOTE — DISCHARGE NOTE PROVIDER - HOSPITAL COURSE
Patient is a 62 y/o male w/ PMHx of HTN, HLD, and DM admitted for dysphagia and feeling of foreign body stuck in throat since last night. underwent EGD which showed no impacted foot but lower esophageal stricture and some gastritis. biopsies were taken of esophagus and stomach. patient tolerated PO afterwards and is being discharged on omeprazole and will follow up with GI. Patient is a 64 y/o male w/ PMHx of HTN, HLD, and DM admitted for dysphagia and feeling of foreign body stuck in throat since last night. underwent EGD which showed no impacted food but lower esophageal stricture and some gastritis. biopsies were taken of esophagus and stomach. patient tolerated PO afterwards and is being discharged on omeprazole and will follow up with GI.

## 2024-05-05 NOTE — ED ADULT TRIAGE NOTE - CHIEF COMPLAINT QUOTE
" I ate a piece of short ribs and I fel it stuck in my throat" pt speaking in complete sentences, states he is bale to swallow his saliva, but has some nausea vomiting and unable to really drink some water due to the discomfort

## 2024-05-05 NOTE — CONSULT NOTE ADULT - NS ATTEND AMEND GEN_ALL_CORE FT
Esophageal foreign body -     Patient with hx of dysphagia- required esophageal dilation 6 months ago. Has  never required EGD for food impaction in the past. Now unable to tolerate secretions ,     A- +BS, soft, non tender     Patient to have urgent EGD for foreign body removal . Not tolerating secretions  I explained the risks and benefits of egd including , but not limited to bleeding, infection , perforation .     Discussed plan for EGD with ER attending and PA.

## 2024-05-05 NOTE — CONSULT NOTE ADULT - ASSESSMENT
Patient is a 64 y/o male w/ PMHx of HTN, HLD, and DM who presents with a chief complaint of foreign body sensation.    #foreign body   #dysphagia  - Plan for EGD today   - Keep NPO  - Strict aspiration precautions  - Antiemetics as needed  - Follow up outpatient with his GI for further workup   _________________________________________________________________  Assessment and recommendations are final when note is signed by the attending physician.

## 2024-05-05 NOTE — DISCHARGE NOTE PROVIDER - NSDCFUSCHEDAPPT_GEN_ALL_CORE_FT
[facility]  [facility] Patient Kellie  Scheduled Appointment: 06/01/2024

## 2024-05-05 NOTE — CONSULT NOTE ADULT - PROBLEM SELECTOR RECOMMENDATION 9
Patient with hx of dysphagia- required esophageal dilation 6 months ago. Has  never required EGD for food impaction in the past     Patient to have urgent EGD for foreign body removal . Not tolerating secretions  I explained the risks and benefits of egd including , but not limited to bleeding, infection , perforation .     Discussed plan for EGD with ER attending and PA.

## 2024-05-05 NOTE — PROGRESS NOTE ADULT - SUBJECTIVE AND OBJECTIVE BOX
EGD showed  mild lower esophageal stricture , fundic gland polyps, gastritis . Meat impaction had cleared. - no impaction seen   D/C home   F/u with his [facility] GI  I spoke to wife . I told her to have pt start Prilosec 20 mg bid and F/U with his GI  call in 1 week for biopsy results- Rule eosinophilic esophagitis [phone]

## 2024-05-05 NOTE — ED PROVIDER NOTE - CLINICAL SUMMARY MEDICAL DECISION MAKING FREE TEXT BOX
64 yo male nontoxic stable vitals tolerating secretions currently presenting with FB sensation to throat since last evening.   GI consulted   glucagon and fluids 64 yo male nontoxic stable vitals tolerating secretions currently presenting with FB sensation to throat since last evening.   GI consulted   glucagon and fluid    OR for EGD

## 2024-05-05 NOTE — H&P ADULT - NSHPPHYSICALEXAM_GEN_ALL_CORE
General; no acute distress  HEENT: normocephalic, atraumatic  Lungs: clear to auscultation  CVS; RRR. S1, S2. no added sounds  Abdomen: soft, non tender, non distended  CNS: AAOx3. no focal deficit

## 2024-05-05 NOTE — H&P ADULT - HISTORY OF PRESENT ILLNESS
Patient is a 62 y/o male w/ PMHx of HTN, HLD, and DM who presents with a chief complaint of dysphagia and feeling of foreign body stuck in throat since last night.  Patient reports he attended a wedding last night and around 10pm he ate some short ribs. since then he has a feeling of something stuck in lower part of throat and has not been able to eat or drink anything since then. Patient reports that food does frequently get stuck in his esophagus, but he is able to clear it with liquids. He states he tried to dislodge bolus with water, however when he drinks water it immediately comes back up. He had an endoscopy 6 months ago for his dysphagia, and reports "a balloon was used to inflate" his esophagus. He also reports intermittent acid reflux for which he takes OTC Pepcid . Has heartburn few times a week.He has been seen by GI and is scheduled to go to OR for foreign body removal via EGD.

## 2024-05-05 NOTE — DISCHARGE NOTE PROVIDER - NSDCMRMEDTOKEN_GEN_ALL_CORE_FT
amLODIPine 5 mg oral tablet: 1 tab(s) orally once a day  atorvastatin 20 mg oral tablet: 1 tab(s) orally once a day (at bedtime)  losartan 100 mg oral tablet: 1 tab(s) orally once a day  metFORMIN 500 mg oral tablet: 2 tab(s) orally 2 times a day  omeprazole 20 mg oral delayed release capsule: 1 cap(s) orally 2 times a day can get over the counter

## 2024-05-05 NOTE — DISCHARGE NOTE PROVIDER - NSDCCPCAREPLAN_GEN_ALL_CORE_FT
PRINCIPAL DISCHARGE DIAGNOSIS  Diagnosis: Foreign body of throat  Assessment and Plan of Treatment: resolved.      SECONDARY DISCHARGE DIAGNOSES  Diagnosis: Esophageal stricture  Assessment and Plan of Treatment: follow up with GI to get biopsy results

## 2024-05-05 NOTE — DISCHARGE NOTE NURSING/CASE MANAGEMENT/SOCIAL WORK - PATIENT PORTAL LINK FT
You can access the FollowMyHealth Patient Portal offered by [facility] by registering at the following website: [email or web address]. By joining [employer]’s FollowMyHealth portal, you will also be able to view your health information using other applications (apps) compatible with our system.

## 2024-05-05 NOTE — ED PROVIDER NOTE - PHYSICAL EXAMINATION
Gen: Well appearing in NAD  Head: NC/AT  Neck: trachea midline no crepitus to anterior lateral neck   tongue and uvula midline oropharnyx unremarkable no fb visualizes directly on exam   Resp:  No distress  Ext: no deformities  Neuro:  A&O appears non focal  Skin:  Warm and dry as visualized  Psych:  Normal affect and mood [facility]  Concussion Program  [address] [place] [address]   Phone: [phone]  Fax:   Follow Up Time:

## 2024-05-08 LAB — SURGICAL PATHOLOGY STUDY: SIGNIFICANT CHANGE UP

## 2024-05-13 ENCOUNTER — NON-APPOINTMENT (OUTPATIENT)
Age: 64
End: 2024-05-13

## 2024-05-13 RX ORDER — OMEPRAZOLE 40 MG/1
40 CAPSULE, DELAYED RELEASE ORAL TWICE DAILY
Qty: 180 | Refills: 3 | Status: ACTIVE | COMMUNITY
Start: 2024-05-13 | End: 1900-01-01

## 2024-05-14 PROBLEM — E11.9 TYPE 2 DIABETES MELLITUS WITHOUT COMPLICATIONS: Chronic | Status: ACTIVE | Noted: 2024-05-05

## 2024-05-14 PROBLEM — I10 ESSENTIAL (PRIMARY) HYPERTENSION: Chronic | Status: ACTIVE | Noted: 2024-05-05

## 2024-05-14 PROBLEM — E78.5 HYPERLIPIDEMIA, UNSPECIFIED: Chronic | Status: ACTIVE | Noted: 2024-05-05

## 2024-05-17 ENCOUNTER — APPOINTMENT (OUTPATIENT)
Dept: GASTROENTEROLOGY | Facility: CLINIC | Age: 64
End: 2024-05-17
Payer: COMMERCIAL

## 2024-05-17 VITALS
BODY MASS INDEX: 29.19 KG/M2 | HEIGHT: 67 IN | DIASTOLIC BLOOD PRESSURE: 82 MMHG | OXYGEN SATURATION: 97 % | WEIGHT: 186 LBS | SYSTOLIC BLOOD PRESSURE: 145 MMHG | HEART RATE: 79 BPM

## 2024-05-17 DIAGNOSIS — R10.13 EPIGASTRIC PAIN: ICD-10-CM

## 2024-05-17 PROCEDURE — 99214 OFFICE O/P EST MOD 30 MIN: CPT

## 2024-05-17 RX ORDER — PREDNISONE 10 MG/1
10 TABLET ORAL
Qty: 50 | Refills: 1 | Status: ACTIVE | COMMUNITY
Start: 2024-05-17 | End: 1900-01-01

## 2024-05-17 NOTE — ASSESSMENT
[FreeTextEntry1] : Patient with new diagnosis of eosinophilic esophagitis   new prescription sent to pharmacy for omeprazole 40 mg po bid  new prescription for prednisone sent to pharmacy with instructions for tapering over three weeks; patient advised  to careful check his blood sugars and notify me if elevcated   after prednisone tapered off, plan to initiate therapy with budesonide

## 2024-05-17 NOTE — PHYSICAL EXAM

## 2024-05-26 ENCOUNTER — NON-APPOINTMENT (OUTPATIENT)
Age: 64
End: 2024-05-26

## 2024-05-29 ENCOUNTER — RX RENEWAL (OUTPATIENT)
Age: 64
End: 2024-05-29

## 2024-05-29 RX ORDER — LOSARTAN POTASSIUM 100 MG/1
100 TABLET, FILM COATED ORAL
Qty: 90 | Refills: 1 | Status: ACTIVE | COMMUNITY
Start: 2019-02-22 | End: 1900-01-01

## 2024-06-01 ENCOUNTER — APPOINTMENT (OUTPATIENT)
Dept: CARE COORDINATION | Facility: HOME HEALTH | Age: 64
End: 2024-06-01

## 2024-06-01 PROCEDURE — 99457 RPM TX MGMT 1ST 20 MIN: CPT

## 2024-06-03 NOTE — ASSESSMENT
[FreeTextEntry1] :        [employer] data reviewed; patient not at goal with interactive encounters and monitoring. Correct blood pressure cuff size is confirmed. Will continue to review medications and times, nutrition, sleep hygiene, physical activity, stress management, social connections, and avoidance of risky substances. Plan is to continue to monitor blood pressure and adjust medications as indicated.       AT GOAL:   [employer] data reviewed; patient at goal with interactive encounters and monitoring. Correct blood pressure cuff size is confirmed. Will continue to review medications and times, nutrition, sleep hygiene, physical activity, stress management, social connections, and avoidance of risky substances. Plan is to continue to monitor blood pressure and consider graduation from program if adequate BP control maintained.       GRADUATE:   [employer] data reviewed; patient at goal with interactive encounters and monitoring. Plan is to graduate from program as adequate BP control maintained.           No blood pressure readings in more than 30 continuous days with multiple patient contacts requesting new readings. Plan is to discharge from [facility] program due to nonparticipation in taking blood pressure readings.

## 2024-06-06 RX ORDER — PREDNISONE 5 MG/1
5 TABLET ORAL
Qty: 30 | Refills: 1 | Status: ACTIVE | COMMUNITY
Start: 2024-06-06 | End: 1900-01-01

## 2024-07-01 ENCOUNTER — APPOINTMENT (OUTPATIENT)
Dept: INTERNAL MEDICINE | Facility: CLINIC | Age: 64
End: 2024-07-01
Payer: COMMERCIAL

## 2024-07-01 ENCOUNTER — LABORATORY RESULT (OUTPATIENT)
Age: 64
End: 2024-07-01

## 2024-07-01 VITALS
WEIGHT: 181 LBS | DIASTOLIC BLOOD PRESSURE: 72 MMHG | SYSTOLIC BLOOD PRESSURE: 125 MMHG | HEIGHT: 67 IN | HEART RATE: 87 BPM | BODY MASS INDEX: 28.41 KG/M2 | TEMPERATURE: 98.6 F | RESPIRATION RATE: 16 BRPM | OXYGEN SATURATION: 98 %

## 2024-07-01 DIAGNOSIS — E11.69 TYPE 2 DIABETES MELLITUS WITH OTHER SPECIFIED COMPLICATION: ICD-10-CM

## 2024-07-01 DIAGNOSIS — K20.0 EOSINOPHILIC ESOPHAGITIS: ICD-10-CM

## 2024-07-01 DIAGNOSIS — E78.5 HYPERLIPIDEMIA, UNSPECIFIED: ICD-10-CM

## 2024-07-01 DIAGNOSIS — R53.83 OTHER MALAISE: ICD-10-CM

## 2024-07-01 DIAGNOSIS — R53.81 OTHER MALAISE: ICD-10-CM

## 2024-07-01 DIAGNOSIS — I10 ESSENTIAL (PRIMARY) HYPERTENSION: ICD-10-CM

## 2024-07-01 PROCEDURE — 99213 OFFICE O/P EST LOW 20 MIN: CPT

## 2024-07-01 PROCEDURE — G2211 COMPLEX E/M VISIT ADD ON: CPT | Mod: NC,1L

## 2024-07-02 ENCOUNTER — LABORATORY RESULT (OUTPATIENT)
Age: 64
End: 2024-07-02

## 2024-07-02 LAB
ALBUMIN SERPL ELPH-MCNC: 4.3 G/DL
ALP BLD-CCNC: 85 U/L
ALT SERPL-CCNC: 48 U/L
AMYLASE/CREAT SERPL: 73 U/L
ANION GAP SERPL CALC-SCNC: 13 MMOL/L
AST SERPL-CCNC: 35 U/L
BILIRUB SERPL-MCNC: 0.6 MG/DL
BUN SERPL-MCNC: 18 MG/DL
CALCIUM SERPL-MCNC: 9.1 MG/DL
CHLORIDE SERPL-SCNC: 105 MMOL/L
CO2 SERPL-SCNC: 20 MMOL/L
CREAT SERPL-MCNC: 1.23 MG/DL
EGFR: 67 ML/MIN/1.73M2
ERYTHROCYTE [SEDIMENTATION RATE] IN BLOOD BY WESTERGREN METHOD: 36 MM/HR
ESTIMATED AVERAGE GLUCOSE: 177 MG/DL
GLUCOSE SERPL-MCNC: 273 MG/DL
HBA1C MFR BLD HPLC: 7.8 %
POTASSIUM SERPL-SCNC: 5.6 MMOL/L
PROT SERPL-MCNC: 6.5 G/DL
SODIUM SERPL-SCNC: 137 MMOL/L
TSH SERPL-ACNC: 1.76 UIU/ML

## 2024-07-03 LAB
BASOPHILS # BLD AUTO: 0.08 K/UL
BASOPHILS NFR BLD AUTO: 0.3 %
EOSINOPHIL # BLD AUTO: 0.17 K/UL
EOSINOPHIL NFR BLD AUTO: 0.6 %
HCT VFR BLD CALC: 36.9 %
HCV AB SER QL: NONREACTIVE
HCV S/CO RATIO: 0.06 S/CO
HGB BLD-MCNC: 11.5 G/DL
IMM GRANULOCYTES NFR BLD AUTO: 0.1 %
LYMPHOCYTES # BLD AUTO: 23.1 K/UL
LYMPHOCYTES NFR BLD AUTO: 80.4 %
MAN DIFF?: NORMAL
MCHC RBC-ENTMCNC: 31.2 GM/DL
MCHC RBC-ENTMCNC: 31.2 PG
MCV RBC AUTO: 100 FL
MONOCYTES # BLD AUTO: 2.69 K/UL
MONOCYTES NFR BLD AUTO: 9.4 %
NEUTROPHILS # BLD AUTO: 2.64 K/UL
NEUTROPHILS NFR BLD AUTO: 9.2 %
PLATELET # BLD AUTO: 160 K/UL
RBC # BLD: 3.69 M/UL
RBC # FLD: 14.3 %
WBC # FLD AUTO: 28.72 K/UL

## 2024-07-05 ENCOUNTER — TRANSCRIPTION ENCOUNTER (OUTPATIENT)
Age: 64
End: 2024-07-05

## 2024-07-05 LAB — ANA SER IF-ACNC: NEGATIVE

## 2024-07-12 ENCOUNTER — RX RENEWAL (OUTPATIENT)
Age: 64
End: 2024-07-12

## 2024-10-30 ENCOUNTER — RX RENEWAL (OUTPATIENT)
Age: 64
End: 2024-10-30

## 2025-05-13 ENCOUNTER — RX RENEWAL (OUTPATIENT)
Age: 65
End: 2025-05-13

## 2025-05-20 ENCOUNTER — NON-APPOINTMENT (OUTPATIENT)
Age: 65
End: 2025-05-20

## 2025-05-22 ENCOUNTER — APPOINTMENT (OUTPATIENT)
Dept: INTERNAL MEDICINE | Facility: CLINIC | Age: 65
End: 2025-05-22
Payer: COMMERCIAL

## 2025-05-22 VITALS — SYSTOLIC BLOOD PRESSURE: 128 MMHG | DIASTOLIC BLOOD PRESSURE: 68 MMHG

## 2025-05-22 VITALS
DIASTOLIC BLOOD PRESSURE: 70 MMHG | RESPIRATION RATE: 16 BRPM | TEMPERATURE: 98.5 F | BODY MASS INDEX: 30.3 KG/M2 | HEART RATE: 84 BPM | HEIGHT: 67 IN | WEIGHT: 193.06 LBS | OXYGEN SATURATION: 97 % | SYSTOLIC BLOOD PRESSURE: 133 MMHG

## 2025-05-22 DIAGNOSIS — I10 ESSENTIAL (PRIMARY) HYPERTENSION: ICD-10-CM

## 2025-05-22 DIAGNOSIS — C91.10 CHRONIC LYMPHOCYTIC LEUKEMIA OF B-CELL TYPE NOT HAVING ACHIEVED REMISSION: ICD-10-CM

## 2025-05-22 DIAGNOSIS — E78.5 HYPERLIPIDEMIA, UNSPECIFIED: ICD-10-CM

## 2025-05-22 DIAGNOSIS — E11.69 TYPE 2 DIABETES MELLITUS WITH OTHER SPECIFIED COMPLICATION: ICD-10-CM

## 2025-05-22 PROCEDURE — G2211 COMPLEX E/M VISIT ADD ON: CPT | Mod: NC

## 2025-05-22 PROCEDURE — 99213 OFFICE O/P EST LOW 20 MIN: CPT

## 2025-07-07 ENCOUNTER — RX RENEWAL (OUTPATIENT)
Age: 65
End: 2025-07-07

## 2025-09-10 ENCOUNTER — APPOINTMENT (OUTPATIENT)
Dept: INTERNAL MEDICINE | Facility: CLINIC | Age: 65
End: 2025-09-10

## (undated) DEVICE — WARMING BLANKET FULL ADULT

## (undated) DEVICE — GLV 7.5 PROTEXIS (WHITE)

## (undated) DEVICE — FORCEP BIOPSY ENDOSCOPIC 2.8MM DISP

## (undated) DEVICE — TUBING SUCTION CONN 6FT STERILE

## (undated) DEVICE — GLV 8 PROTEXIS (WHITE)

## (undated) DEVICE — CATH IV SAFE BC 20G X 1.16" (PINK)

## (undated) DEVICE — TUBING IV SET GRAVITY 3Y 100" MACRO

## (undated) DEVICE — BALLOON US ENDO

## (undated) DEVICE — TUBING CONNECTING 6MM 20FT

## (undated) DEVICE — BITE BLOCK ADULT 20 X 27MM (GREEN)

## (undated) DEVICE — ELCTR GROUNDING PAD ADULT COVIDIEN

## (undated) DEVICE — WARMING BLANKET LOWER ADULT

## (undated) DEVICE — SENSOR O2 FINGER ADULT

## (undated) DEVICE — FOLEY HOLDER STATLOCK 2 WAY ADULT

## (undated) DEVICE — SOL IRR POUR H2O 1000ML

## (undated) DEVICE — VENODYNE/SCD SLEEVE CALF MEDIUM

## (undated) DEVICE — SOL IRR POUR NS 0.9% 1000ML

## (undated) DEVICE — SYR ALLIANCE II INFLATION 60ML

## (undated) DEVICE — TUBING SUCTION 20FT

## (undated) DEVICE — SUCTION YANKAUER NO CONTROL VENT

## (undated) DEVICE — PACK IV START WITH CHG

## (undated) DEVICE — CATH IV SAFE BC 22G X 1" (BLUE)